# Patient Record
Sex: FEMALE | Race: BLACK OR AFRICAN AMERICAN | NOT HISPANIC OR LATINO | Employment: FULL TIME | URBAN - METROPOLITAN AREA
[De-identification: names, ages, dates, MRNs, and addresses within clinical notes are randomized per-mention and may not be internally consistent; named-entity substitution may affect disease eponyms.]

---

## 2017-06-21 ENCOUNTER — HOSPITAL ENCOUNTER (OUTPATIENT)
Dept: RADIOLOGY | Facility: HOSPITAL | Age: 52
Discharge: HOME | End: 2017-06-21
Attending: ORTHOPAEDIC SURGERY
Payer: OTHER GOVERNMENT

## 2017-06-21 ENCOUNTER — OFFICE VISIT (OUTPATIENT)
Dept: ORTHOPEDICS | Facility: CLINIC | Age: 52
End: 2017-06-21
Payer: OTHER GOVERNMENT

## 2017-06-21 VITALS — BODY MASS INDEX: 31.65 KG/M2 | WEIGHT: 190 LBS | HEIGHT: 65 IN

## 2017-06-21 DIAGNOSIS — M25.512 ACUTE PAIN OF LEFT SHOULDER: Primary | ICD-10-CM

## 2017-06-21 DIAGNOSIS — M75.102 TEAR OF LEFT ROTATOR CUFF, UNSPECIFIED TEAR EXTENT: ICD-10-CM

## 2017-06-21 DIAGNOSIS — M25.512 ACUTE PAIN OF LEFT SHOULDER: ICD-10-CM

## 2017-06-21 PROCEDURE — 73030 X-RAY EXAM OF SHOULDER: CPT | Mod: 26,LT,, | Performed by: RADIOLOGY

## 2017-06-21 PROCEDURE — 73030 X-RAY EXAM OF SHOULDER: CPT | Mod: TC,PN,LT

## 2017-06-21 PROCEDURE — 99999 PR PBB SHADOW E&M-EST. PATIENT-LVL II: CPT | Mod: PBBFAC,,, | Performed by: ORTHOPAEDIC SURGERY

## 2017-06-21 PROCEDURE — 99213 OFFICE O/P EST LOW 20 MIN: CPT | Mod: 25,S$GLB,, | Performed by: ORTHOPAEDIC SURGERY

## 2017-06-21 PROCEDURE — 20610 DRAIN/INJ JOINT/BURSA W/O US: CPT | Mod: LT,S$GLB,, | Performed by: ORTHOPAEDIC SURGERY

## 2017-06-21 RX ORDER — TRIAMCINOLONE ACETONIDE 40 MG/ML
40 INJECTION, SUSPENSION INTRA-ARTICULAR; INTRAMUSCULAR
Status: DISCONTINUED | OUTPATIENT
Start: 2017-06-21 | End: 2017-06-21 | Stop reason: HOSPADM

## 2017-06-21 RX ADMIN — TRIAMCINOLONE ACETONIDE 40 MG: 40 INJECTION, SUSPENSION INTRA-ARTICULAR; INTRAMUSCULAR at 02:06

## 2017-06-21 NOTE — PROGRESS NOTES
"DATE: 6/21/2017  PATIENT: Antonina Roman    Attending Physician: Glynn Fitch M.D.    HISTORY:  Antonina Roman is a 51 y.o. female who returns for follow up evaluation of  her left shoulder.  She is seen in December 2016 diagnosed with a high-grade partial-thickness rotator cuff tear versus small full-thickness rotator cuff tear.  States she still has a moderate amount of pain.  Pain is reported at 6/10 today.  She does have some improvement from cortisone injection however she still notes moderate discomfort    PMH/PSH/FamHx/SocHx:  Reviewed and unchanged from prior visit    ROS:  Constitution: Negative for chills, fever, and sweats. Negative for unexplained weight loss.  HENT: Negative for headaches and blurry vision.   Cardiovascular: Negative for chest pain, irregular heartbeat, leg swelling and palpitations.   Respiratory: Negative for cough and shortness of breath.   Gastrointestinal: Negative for abdominal pain, heartburn, nausea and vomiting.   Genitourinary: Negative for bladder incontinence and dysuria.   Musculoskeletal: Negative for systemic arthritis, joint swelling, muscle weakness and myalgias.   Neurological: Negative for numbness.   Psychiatric/Behavioral: Negative for depression.   Endocrine: Negative for polyuria.   Hematologic/Lymphatic: Negative for bleeding disorders.  Skin: Negative for poor wound healing.       EXAM:  Ht 5' 5" (1.651 m)   Wt 86.2 kg (190 lb)   BMI 31.62 kg/m²   Antonina Roman is a well developed, well nourished female in no acute distress. Physical examination of the left shoulder evaluated the following:    Inspection, palpation and ROM of the cervical spine  Disc compression testing bilaterally  Inspection for swelling, ecchymosis, erythema, deformity and atrophy  Tenderness to palpation of the soft tissue and bony structures  Active and passive range of motion  Sensation of the shoulder and upper extremity  Motor strength in the deltoid, supraspinatus, internal rotators " and external rotators  Impingement, apprehension, relocation and Speed's tests  Upper extremity vascular exam (skin temp,color, capillary refill)  Inspection for pseudomotor signs    Remarkable findings included:  Full range of motion cervical spine.  Negative disc compression sign.  Range of motion is intact to the left shoulder.  Positive impingement sign on exam.        IMAGING:   No new x-rays are performed today.    ASSESSMENT:  High-grade partial-thickness versus small full-thickness rotator cuff tear left shoulder    PLAN:  The implications of the patient's evolution of symptoms and findings were explained to the patient in detail.Treatment option discussed included continued observation, repeat cortisone injection, and arthroscopic rotator cuff repair. All questions answered and the patient wishes to proceed with repeat injection as she states she is currently in school and cannot take time off the surgical procedure.  Injection performed today (see procedure note).  Follow-up if not improving or worse..          This note was dictated using voice recognition software and may contain grammatical errors

## 2017-06-21 NOTE — PROCEDURES
Large Joint Aspiration/Injection  Date/Time: 6/21/2017 2:30 PM  Performed by: TG OLGUIN  Authorized by: TG OLGUIN     Consent Done?:  Yes (Verbal)  Indications:  Pain  Timeout: Prior to procedure the correct patient, procedure, and site was verified      Location:  Shoulder  Site:  L subacromial bursa  Prep: Patient was prepped and draped in usual sterile fashion    Ultrasonic Guidance for needle placement: No  Needle size:  25 G  Approach:  Posterior  Medications:  40 mg triamcinolone acetonide 40 mg/mL  Patient tolerance:  Patient tolerated the procedure well with no immediate complications

## 2017-08-16 ENCOUNTER — OFFICE VISIT (OUTPATIENT)
Dept: ORTHOPEDICS | Facility: CLINIC | Age: 52
End: 2017-08-16
Payer: OTHER GOVERNMENT

## 2017-08-16 VITALS — WEIGHT: 190 LBS | HEIGHT: 65 IN | BODY MASS INDEX: 31.65 KG/M2

## 2017-08-16 DIAGNOSIS — M75.102 TEAR OF LEFT ROTATOR CUFF, UNSPECIFIED TEAR EXTENT: Primary | ICD-10-CM

## 2017-08-16 PROCEDURE — 99999 PR PBB SHADOW E&M-EST. PATIENT-LVL II: CPT | Mod: PBBFAC,,, | Performed by: ORTHOPAEDIC SURGERY

## 2017-08-16 PROCEDURE — 3008F BODY MASS INDEX DOCD: CPT | Mod: S$GLB,,, | Performed by: ORTHOPAEDIC SURGERY

## 2017-08-16 PROCEDURE — 99213 OFFICE O/P EST LOW 20 MIN: CPT | Mod: S$GLB,,, | Performed by: ORTHOPAEDIC SURGERY

## 2017-08-17 NOTE — PROGRESS NOTES
"DATE: 8/16/2017  PATIENT: Antonina Roman    Attending Physician: Glynn Fitch M.D.    HISTORY:  Antonina Roman is a 51 y.o. female who returns for follow up evaluation of  her left shoulder.  She's been present diagnosed with a high-grade partial-thickness rotator cuff tear versus small full-thickness rotator cuff tear.  She underwent a cortisone injection on 6/21/17 which she states helped for approximately 10 days.  She has been working modified duty and appears tolerating it.  Pain is still reported at 7/10 today    PMH/PSH/FamHx/SocHx:  Reviewed and unchanged from prior visit    ROS:  Constitution: Negative for chills, fever, and sweats. Negative for unexplained weight loss.  HENT: Negative for headaches and blurry vision.   Cardiovascular: Negative for chest pain, irregular heartbeat, leg swelling and palpitations.   Respiratory: Negative for cough and shortness of breath.   Gastrointestinal: Negative for abdominal pain, heartburn, nausea and vomiting.   Genitourinary: Negative for bladder incontinence and dysuria.   Musculoskeletal: Negative for systemic arthritis, joint swelling, muscle weakness and myalgias.   Neurological: Negative for numbness.   Psychiatric/Behavioral: Negative for depression.   Endocrine: Negative for polyuria.   Hematologic/Lymphatic: Negative for bleeding disorders.  Skin: Negative for poor wound healing.       EXAM:  Ht 5' 5" (1.651 m)   Wt 86.2 kg (190 lb)   BMI 31.62 kg/m²   Antonina Roman is a well developed, well nourished female in no acute distress. Physical examination of the left shoulder evaluated the following:     Inspection, palpation and ROM of the cervical spine  Disc compression testing bilaterally  Inspection for swelling, ecchymosis, erythema, deformity and atrophy  Tenderness to palpation of the soft tissue and bony structures  Active and passive range of motion  Sensation of the shoulder and upper extremity  Motor strength in the deltoid, supraspinatus, internal " rotators and external rotators  Impingement, apprehension, relocation and Speed's tests  Upper extremity vascular exam (skin temp,color, capillary refill)  Inspection for pseudomotor signs     Remarkable findings included:  Full range of motion cervical spine.  Negative disc compression sign.  Range of motion is intact to the left shoulder.  Positive impingement sign on exam.    IMAGING:   No new x-rays are performed today.    ASSESSMENT:  High-grade partial-thickness versus small full-thickness rotator cuff tear left shoulder    PLAN:  The implications of the patient's evolution of symptoms and findings were explained to the patient in detail.  Again we discussed conservative treatment versus arthroscopic rotator cuff repair.  I've also explained that injections every 3-6 months to temporize her symptoms.  She reports she is tolerating light duty.  She would like to try to finish schooling before considering any surgical intervention.  She'll remain on light duty status.  Should she wish to pursue arthroscopic treatment she'll return for follow-up exam.  Follow-up if not improving or worse.          This note was dictated using voice recognition software and may contain grammatical errors

## 2017-08-29 ENCOUNTER — TELEPHONE (OUTPATIENT)
Dept: ORTHOPEDICS | Facility: CLINIC | Age: 52
End: 2017-08-29

## 2017-08-29 NOTE — TELEPHONE ENCOUNTER
Contacted pt. Pt states that was an earlier message and pt has already spoken to our office since this message.

## 2017-08-29 NOTE — TELEPHONE ENCOUNTER
----- Message from Antonina Castro sent at 8/29/2017  2:25 PM CDT -----  Call pt [phone]  Returning call

## 2017-09-27 ENCOUNTER — OFFICE VISIT (OUTPATIENT)
Dept: ORTHOPEDICS | Facility: CLINIC | Age: 52
End: 2017-09-27
Payer: OTHER GOVERNMENT

## 2017-09-27 VITALS — BODY MASS INDEX: 31.65 KG/M2 | WEIGHT: 190 LBS | HEIGHT: 65 IN

## 2017-09-27 DIAGNOSIS — M75.82 ROTATOR CUFF TENDINITIS, LEFT: Primary | ICD-10-CM

## 2017-09-27 PROCEDURE — 99999 PR PBB SHADOW E&M-EST. PATIENT-LVL II: CPT | Mod: PBBFAC,,, | Performed by: ORTHOPAEDIC SURGERY

## 2017-09-27 PROCEDURE — 20610 DRAIN/INJ JOINT/BURSA W/O US: CPT | Mod: LT,S$GLB,, | Performed by: ORTHOPAEDIC SURGERY

## 2017-09-27 PROCEDURE — 99213 OFFICE O/P EST LOW 20 MIN: CPT | Mod: 25,S$GLB,, | Performed by: ORTHOPAEDIC SURGERY

## 2017-09-27 PROCEDURE — 3008F BODY MASS INDEX DOCD: CPT | Mod: S$GLB,,, | Performed by: ORTHOPAEDIC SURGERY

## 2017-09-27 RX ORDER — TRIAMCINOLONE ACETONIDE 40 MG/ML
40 INJECTION, SUSPENSION INTRA-ARTICULAR; INTRAMUSCULAR
Status: DISCONTINUED | OUTPATIENT
Start: 2017-09-27 | End: 2017-09-27 | Stop reason: HOSPADM

## 2017-09-27 RX ADMIN — TRIAMCINOLONE ACETONIDE 40 MG: 40 INJECTION, SUSPENSION INTRA-ARTICULAR; INTRAMUSCULAR at 03:09

## 2017-09-27 NOTE — PROGRESS NOTES
"DATE: 9/27/2017  PATIENT: Antonina Roman    Attending Physician: Glynn Fitch M.D.    HISTORY:  Antonina Roman is a 51 y.o. female who returns for follow up evaluation of  her left shoulder.  She's been diagnosed with a high-grade partial-thickness rotator cuff tear versus small full-thickness rotator cuff tear.  She last had an injection 6/21/17.  She's been working modified duty as tolerated.  She states that her symptoms are the same and still rated at 7/10.  She is requesting repeat cortisone injection today.    PMH/PSH/FamHx/SocHx:  Reviewed and unchanged from prior visit    ROS:  Constitution: Negative for chills, fever, and sweats. Negative for unexplained weight loss.  HENT: Negative for headaches and blurry vision.   Cardiovascular: Negative for chest pain, irregular heartbeat, leg swelling and palpitations.   Respiratory: Negative for cough and shortness of breath.   Gastrointestinal: Negative for abdominal pain, heartburn, nausea and vomiting.   Genitourinary: Negative for bladder incontinence and dysuria.   Musculoskeletal: Negative for systemic arthritis, joint swelling, muscle weakness and myalgias.   Neurological: Negative for numbness.   Psychiatric/Behavioral: Negative for depression.   Endocrine: Negative for polyuria.   Hematologic/Lymphatic: Negative for bleeding disorders.  Skin: Negative for poor wound healing.       EXAM:  Ht 5' 5" (1.651 m)   Wt 86.2 kg (190 lb)   BMI 31.62 kg/m²   Antonina Roman is a well developed, well nourished female in no acute distress. Physical examination of the left shoulder evaluated the following:     Inspection, palpation and ROM of the cervical spine  Disc compression testing bilaterally  Inspection for swelling, ecchymosis, erythema, deformity and atrophy  Tenderness to palpation of the soft tissue and bony structures  Active and passive range of motion  Sensation of the shoulder and upper extremity  Motor strength in the deltoid, supraspinatus, internal " rotators and external rotators  Impingement, apprehension, relocation and Speed's tests  Upper extremity vascular exam (skin temp,color, capillary refill)  Inspection for pseudomotor signs     Remarkable findings included:  Full range of motion cervical spine.  Negative disc compression sign.  Range of motion is intact to the left shoulder.  Positive impingement sign on exam.       IMAGING:   No new x-rays are performed.    ASSESSMENT:  High-grade partial-thickness versus small full-thickness rotator cuff tear left shoulder    PLAN:  The implications of the patient's evolution of symptoms and findings were explained to the patient in detail.. repeat injection performed today (see procedure note).  She'll remain on modified duty work.  I've again recommended arthroscopy with rotator cuff repair.  She will think about her options.  Should she have further problems I will see her back.  Follow-up as needed.          This note was dictated using voice recognition software. Please excuse any grammatical or typographical errors.

## 2017-10-18 ENCOUNTER — OFFICE VISIT (OUTPATIENT)
Dept: ORTHOPEDICS | Facility: CLINIC | Age: 52
End: 2017-10-18
Payer: OTHER GOVERNMENT

## 2017-10-18 ENCOUNTER — TELEPHONE (OUTPATIENT)
Dept: ORTHOPEDICS | Facility: CLINIC | Age: 52
End: 2017-10-18

## 2017-10-18 VITALS — WEIGHT: 190 LBS | HEIGHT: 65 IN | BODY MASS INDEX: 31.65 KG/M2

## 2017-10-18 DIAGNOSIS — M75.82 ROTATOR CUFF TENDINITIS, LEFT: Primary | ICD-10-CM

## 2017-10-18 PROCEDURE — 99999 PR PBB SHADOW E&M-EST. PATIENT-LVL II: CPT | Mod: PBBFAC,,, | Performed by: ORTHOPAEDIC SURGERY

## 2017-10-18 PROCEDURE — 99213 OFFICE O/P EST LOW 20 MIN: CPT | Mod: S$GLB,,, | Performed by: ORTHOPAEDIC SURGERY

## 2017-10-18 NOTE — PROGRESS NOTES
"DATE: 10/18/2017  PATIENT: Antonina Roman    Attending Physician: Glynn Fitch M.D.    HISTORY:  Antonina Roman is a 51 y.o. female who returns for follow up evaluation of  her left shoulder.  She's been present diagnosed with a high-grade partial-thickness rotator cuff tear versus small full-thickness rotator cuff tear.  She's undergone previous injections which only helped minimally.  He had discussed surgical treatment.  However she is deferred.  She's been working modified light duty position.  States she is able perform that job like to try to decrease her restrictions.  Pain is reported at 5/10 today.    PMH/PSH/FamHx/SocHx:  Reviewed and unchanged from prior visit    ROS:  Constitution: Negative for chills, fever, and sweats. Negative for unexplained weight loss.  HENT: Negative for headaches and blurry vision.   Cardiovascular: Negative for chest pain, irregular heartbeat, leg swelling and palpitations.   Respiratory: Negative for cough and shortness of breath.   Gastrointestinal: Negative for abdominal pain, heartburn, nausea and vomiting.   Genitourinary: Negative for bladder incontinence and dysuria.   Musculoskeletal: Negative for systemic arthritis, joint swelling, muscle weakness and myalgias.   Neurological: Negative for numbness.   Psychiatric/Behavioral: Negative for depression.   Endocrine: Negative for polyuria.   Hematologic/Lymphatic: Negative for bleeding disorders.  Skin: Negative for poor wound healing.       EXAM:  Ht 5' 5" (1.651 m)   Wt 86.2 kg (190 lb)   BMI 31.62 kg/m²   Antonina Roman is a well developed, well nourished female in no acute distress. Physical examination of the left shoulder evaluated the following:     Inspection, palpation and ROM of the cervical spine  Disc compression testing bilaterally  Inspection for swelling, ecchymosis, erythema, deformity and atrophy  Tenderness to palpation of the soft tissue and bony structures  Active and passive range of motion  Sensation of " the shoulder and upper extremity  Motor strength in the deltoid, supraspinatus, internal rotators and external rotators  Impingement, apprehension, relocation and Speed's tests  Upper extremity vascular exam (skin temp,color, capillary refill)  Inspection for pseudomotor signs     Remarkable findings included:  Full range of motion cervical spine.  Negative disc compression sign.  Range of motion is intact to the left shoulder.  Positive impingement sign on exam.       IMAGING:   No new x-rays are performed today.    ASSESSMENT:  High-grade partial-thickness versus small full-thickness rotator cuff tear left shoulder    PLAN:  The implications of the patient's evolution of symptoms and findings were explained to the patient in detail.  I've explained the Antonina that treatment is either tolerating the symptoms versus arthroscopy and probable rotator cuff repair.  Again she would like to avoid surgery if possible at this time.  However she thinks she can perform more of her duties then previously.  We have changed her work restrictions to no driving more than 30 minutes to times daily and no lifting greater than 20 pounds.  Should she have further problems, I will see her back.  Otherwise, follow-up as needed.          This note was dictated using voice recognition software. Please excuse any grammatical or typographical errors.

## 2017-10-18 NOTE — TELEPHONE ENCOUNTER
----- Message from Sathish Pascual sent at 10/17/2017 11:29 AM CDT -----  Contact: Whitney with [employer]   Whitney with [employer] want to speak with a nurse regarding patient job offer with workers comp, please fax to [phone] or please call [phone]

## 2018-01-04 RX ORDER — METHYLPREDNISOLONE 4 MG/1
TABLET ORAL
Qty: 21 TABLET | Refills: 1 | OUTPATIENT
Start: 2018-01-04

## 2018-01-08 RX ORDER — METHYLPREDNISOLONE 4 MG/1
TABLET ORAL
Qty: 21 TABLET | Refills: 1 | OUTPATIENT
Start: 2018-01-08

## 2018-01-17 ENCOUNTER — OFFICE VISIT (OUTPATIENT)
Dept: ORTHOPEDICS | Facility: CLINIC | Age: 53
End: 2018-01-17
Payer: OTHER GOVERNMENT

## 2018-01-17 VITALS — WEIGHT: 190.06 LBS | BODY MASS INDEX: 31.67 KG/M2 | HEIGHT: 65 IN

## 2018-01-17 DIAGNOSIS — M75.102 TEAR OF LEFT ROTATOR CUFF, UNSPECIFIED TEAR EXTENT: Primary | ICD-10-CM

## 2018-01-17 PROCEDURE — 99213 OFFICE O/P EST LOW 20 MIN: CPT | Mod: S$GLB,,, | Performed by: ORTHOPAEDIC SURGERY

## 2018-01-17 PROCEDURE — 99999 PR PBB SHADOW E&M-EST. PATIENT-LVL II: CPT | Mod: PBBFAC,,, | Performed by: ORTHOPAEDIC SURGERY

## 2018-01-17 RX ORDER — DICLOFENAC SODIUM 10 MG/G
GEL TOPICAL
Refills: 0 | COMMUNITY
Start: 2017-10-11

## 2018-01-17 RX ORDER — HYOSCYAMINE SULFATE 0.12 MG/1
TABLET SUBLINGUAL
Refills: 0 | COMMUNITY
Start: 2017-10-24

## 2018-01-17 RX ORDER — CYANOCOBALAMIN 1000 UG/ML
INJECTION, SOLUTION INTRAMUSCULAR; SUBCUTANEOUS
Refills: 0 | COMMUNITY
Start: 2017-11-20

## 2018-01-18 NOTE — PROGRESS NOTES
"DATE: 1/17/2018  PATIENT: Antonina Roman    Attending Physician: Glynn Fitch M.D.    HISTORY:  Antonina Roman is a 52 y.o. female who returns for follow up evaluation of  her left shoulder.  She's been diagnosed with a high-grade partial-thickness rotator cuff tear.  Surgery was recommended however, he states that she is trying to hold off at this time.  She is been performing light duty work.  However she notes that they've been increasing her job duties seeding the work limitations.  This is aggravated her pain.  Pain is reported at 8/10 today.  She is requesting a refill of Flexeril, meloxicam and a Medrol    PMH/PSH/FamHx/SocHx:  Reviewed and unchanged from prior visit    ROS:  Constitution: Negative for chills, fever, and sweats. Negative for unexplained weight loss.  HENT: Negative for headaches and blurry vision.   Cardiovascular: Negative for chest pain, irregular heartbeat, leg swelling and palpitations.   Respiratory: Negative for cough and shortness of breath.   Gastrointestinal: Negative for abdominal pain, heartburn, nausea and vomiting.   Genitourinary: Negative for bladder incontinence and dysuria.   Musculoskeletal: Negative for systemic arthritis, joint swelling, muscle weakness and myalgias.   Neurological: Negative for numbness.   Psychiatric/Behavioral: Negative for depression.   Endocrine: Negative for polyuria.   Hematologic/Lymphatic: Negative for bleeding disorders.  Skin: Negative for poor wound healing.       EXAM:  Ht 5' 5" (1.651 m)   Wt 86.2 kg (190 lb 0.6 oz)   BMI 31.62 kg/m²   Antonina Roman is a well developed, well nourished female in no acute distress. Physical examination of the left shoulder evaluated the following:     Inspection, palpation and ROM of the cervical spine  Disc compression testing bilaterally  Inspection for swelling, ecchymosis, erythema, deformity and atrophy  Tenderness to palpation of the soft tissue and bony structures  Active and passive range of " motion  Sensation of the shoulder and upper extremity  Motor strength in the deltoid, supraspinatus, internal rotators and external rotators  Impingement, apprehension, relocation and Speed's tests  Upper extremity vascular exam (skin temp,color, capillary refill)  Inspection for pseudomotor signs     Remarkable findings included:  Full range of motion cervical spine.  Negative disc compression sign.  Range of motion is intact to the left shoulder.  Positive impingement sign on exam.    IMAGING:   No x-rays are performed today.    ASSESSMENT:  High-grade partial-thickness versus small full-thickness rotator cuff tear left shoulder    PLAN:  The implications of the patient's evolution of symptoms and findings were explained to the patient in detail.  Again explained to Oksana that at this point treatment options include tolerating the symptoms versus arthroscopy.  Again she wishes to remain on modified duty work and noted reviewed her restrictions.  I stated that the less she we'll proceed with arthroscopic rotator cuff repair, that he is at maximum medical improvement and those restrictions will not change.  Work restrictions are no driving more than 30 minutes twice daily and no lifting greater than 20 pounds.  I've also explained that medication refills should come through her primary care physician.  Should she wish to pursue arthroscopic rotator cuff repair I will see her back.  Otherwise, follow-up as needed.         This note was dictated using voice recognition software. Please excuse any grammatical or typographical errors.

## 2019-08-13 ENCOUNTER — OFFICE VISIT (OUTPATIENT)
Dept: ORTHOPEDICS | Facility: CLINIC | Age: 54
End: 2019-08-13
Payer: OTHER GOVERNMENT

## 2019-08-13 VITALS
BODY MASS INDEX: 30.82 KG/M2 | SYSTOLIC BLOOD PRESSURE: 138 MMHG | DIASTOLIC BLOOD PRESSURE: 88 MMHG | HEIGHT: 65 IN | WEIGHT: 185 LBS | HEART RATE: 63 BPM

## 2019-08-13 DIAGNOSIS — M75.42 IMPINGEMENT SYNDROME OF LEFT SHOULDER: ICD-10-CM

## 2019-08-13 DIAGNOSIS — M54.12 CERVICAL RADICULITIS: Primary | ICD-10-CM

## 2019-08-13 DIAGNOSIS — M75.102 TEAR OF LEFT ROTATOR CUFF, UNSPECIFIED TEAR EXTENT: ICD-10-CM

## 2019-08-13 PROCEDURE — 99204 PR OFFICE/OUTPT VISIT, NEW, LEVL IV, 45-59 MIN: ICD-10-PCS | Mod: S$GLB,,, | Performed by: ORTHOPAEDIC SURGERY

## 2019-08-13 PROCEDURE — 99204 OFFICE O/P NEW MOD 45 MIN: CPT | Mod: S$GLB,,, | Performed by: ORTHOPAEDIC SURGERY

## 2019-08-13 RX ORDER — PANTOPRAZOLE SODIUM 40 MG/1
TABLET, DELAYED RELEASE ORAL
Refills: 0 | COMMUNITY
Start: 2019-07-16 | End: 2019-08-13

## 2019-08-13 RX ORDER — ACYCLOVIR 400 MG/1
TABLET ORAL
Refills: 11 | COMMUNITY
Start: 2019-07-20

## 2019-08-13 RX ORDER — SUCRALFATE 1 G/1
TABLET ORAL
Refills: 0 | COMMUNITY
Start: 2019-07-16

## 2019-08-13 RX ORDER — LANSOPRAZOLE 30 MG/1
30 CAPSULE, DELAYED RELEASE ORAL DAILY
COMMUNITY

## 2019-08-13 NOTE — PROCEDURES
Large Joint Aspiration/Injection: L subacromial bursa  Date/Time: 8/13/2019 8:00 AM  Performed by: Sami Bunch MD  Authorized by: Sami Bunch MD     Consent Done?:  Yes (Verbal)  Indications:  Pain  Procedure site marked: Yes    Timeout: Prior to procedure the correct patient, procedure, and site was verified    Anesthesia  Local anesthesia used  Anesthesia: local infiltration  Anesthetic: lidocaine 1% without epinephrine    Location:  Shoulder  Site:  L subacromial bursa  Prep: Patient was prepped and draped in usual sterile fashion    Needle size:  25 G  Medications:  40 mg methylPREDNISolone acetate 40 mg/mL  Patient tolerance:  Patient tolerated the procedure well with no immediate complications
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
## 2019-09-09 ENCOUNTER — TELEPHONE (OUTPATIENT)
Dept: ORTHOPEDICS | Facility: CLINIC | Age: 54
End: 2019-09-09

## 2019-09-09 NOTE — TELEPHONE ENCOUNTER
----- Message from Mattie Padilla sent at 9/9/2019  8:29 AM CDT -----  Contact: Patient  Patient need a letter from Dr. Bunch stating she can not do compressions. Please call [phone]

## 2019-09-09 NOTE — LETTER
September 9, 2019    Antonina Roman  [address]  [place] [address]             [facility]  [address]  [place] [address]  Phone: [phone]  Fax: [phone] To Whom It May Concern:     Ms. Roman has a left shoulder rotator cuff tear along with subacromial impingement. Could you please excuse Ms. Roman from doing compressions as this can be painful for her at this time.    If you have any questions, please call my office.      Sincerely,      Sami Bunch MD

## 2019-09-11 ENCOUNTER — TELEPHONE (OUTPATIENT)
Dept: ORTHOPEDICS | Facility: CLINIC | Age: 54
End: 2019-09-11

## 2019-09-27 ENCOUNTER — TELEPHONE (OUTPATIENT)
Dept: ORTHOPEDICS | Facility: CLINIC | Age: 54
End: 2019-09-27

## 2019-09-27 NOTE — TELEPHONE ENCOUNTER
----- Message from Kristin Chang sent at 9/26/2019  8:56 AM CDT -----  Contact: Patient [phone]  Please call patient about her surgery date. Dr Bunch

## 2019-10-10 ENCOUNTER — OFFICE VISIT (OUTPATIENT)
Dept: ORTHOPEDICS | Facility: CLINIC | Age: 54
End: 2019-10-10
Payer: OTHER GOVERNMENT

## 2019-10-10 VITALS
WEIGHT: 188 LBS | SYSTOLIC BLOOD PRESSURE: 125 MMHG | HEIGHT: 65 IN | HEART RATE: 106 BPM | BODY MASS INDEX: 31.32 KG/M2 | DIASTOLIC BLOOD PRESSURE: 78 MMHG

## 2019-10-10 DIAGNOSIS — M75.42 IMPINGEMENT SYNDROME OF LEFT SHOULDER: Primary | ICD-10-CM

## 2019-10-10 DIAGNOSIS — M75.82 ROTATOR CUFF TENDINITIS, LEFT: ICD-10-CM

## 2019-10-10 PROCEDURE — 99213 PR OFFICE/OUTPT VISIT, EST, LEVL III, 20-29 MIN: ICD-10-PCS | Mod: 25,S$GLB,, | Performed by: ORTHOPAEDIC SURGERY

## 2019-10-10 PROCEDURE — 20610 DRAIN/INJ JOINT/BURSA W/O US: CPT | Mod: LT,S$GLB,, | Performed by: ORTHOPAEDIC SURGERY

## 2019-10-10 PROCEDURE — 99213 OFFICE O/P EST LOW 20 MIN: CPT | Mod: 25,S$GLB,, | Performed by: ORTHOPAEDIC SURGERY

## 2019-10-10 PROCEDURE — 20610 LARGE JOINT ASPIRATION/INJECTION: L SUBACROMIAL BURSA: ICD-10-PCS | Mod: LT,S$GLB,, | Performed by: ORTHOPAEDIC SURGERY

## 2019-10-10 RX ORDER — METHYLPREDNISOLONE ACETATE 40 MG/ML
40 INJECTION, SUSPENSION INTRA-ARTICULAR; INTRALESIONAL; INTRAMUSCULAR; SOFT TISSUE
Status: DISCONTINUED | OUTPATIENT
Start: 2019-10-10 | End: 2019-10-10 | Stop reason: HOSPADM

## 2019-10-10 RX ADMIN — METHYLPREDNISOLONE ACETATE 40 MG: 40 INJECTION, SUSPENSION INTRA-ARTICULAR; INTRALESIONAL; INTRAMUSCULAR; SOFT TISSUE at 03:10

## 2019-10-10 NOTE — PROCEDURES
Large Joint Aspiration/Injection: L subacromial bursa  Date/Time: 10/10/2019 3:45 PM  Performed by: Sami Bunch MD  Authorized by: Sami Bunch MD     Consent Done?:  Yes (Verbal)  Indications:  Pain  Procedure site marked: Yes    Timeout: Prior to procedure the correct patient, procedure, and site was verified    Anesthesia  Local anesthesia used  Anesthesia: local infiltration  Anesthetic: lidocaine 1% without epinephrine    Location:  Shoulder  Site:  L subacromial bursa  Prep: Patient was prepped and draped in usual sterile fashion    Needle size:  25 G  Medications:  40 mg methylPREDNISolone acetate 40 mg/mL; 40 mg methylPREDNISolone acetate 40 mg/mL  Patient tolerance:  Patient tolerated the procedure well with no immediate complications

## 2019-10-10 NOTE — PROGRESS NOTES
[facility]    Subjective:     Chief Complaint:   Chief Complaint   Patient presents with    Left Shoulder - Pain     Here for left shoulder injection today.  Would like to start out patient therapy       Past Medical History:   Diagnosis Date    Arthritis        Past Surgical History:   Procedure Laterality Date    gastric sleeve         Current Outpatient Medications   Medication Sig    acyclovir (ZOVIRAX) 400 MG tablet     biotin 300 mcg Tab Take 1 tablet by mouth once daily.    calcium carbonate (OS-DAMI) 500 mg calcium (1,250 mg) tablet Take 1 tablet by mouth once daily.    cyanocobalamin 1,000 mcg/mL injection     cyanocobalamin, vitamin B-12, 1,000 mcg/mL Kit Inject 1,000 mcg into the muscle every 30 days.    cyclobenzaprine (FLEXERIL) 10 MG tablet take 1 tablet by mouth NIGHTLY if needed for muscle spasm (WILL CAUSE DROWSINESS)    diclofenac sodium 1 % Gel     hyoscyamine (LEVSIN/SL) 0.125 mg Subl dissolve 1 tablet under the tongue four times a day if needed    lansoprazole (PREVACID) 30 MG capsule Take 30 mg by mouth once daily.    multivitamin capsule Take 1 capsule by mouth once daily.    naproxen (NAPROSYN) 500 MG tablet Take 500 mg by mouth 2 (two) times daily with meals.    sucralfate (CARAFATE) 1 gram tablet MIX 1 T WITH 10 ML OF WARM WATER TO MAKE A SLURRY AND THEN TK PO QID    tramadol (ULTRAM) 50 mg tablet Take 50 mg by mouth every 6 (six) hours as needed.     No current facility-administered medications for this visit.        Review of patient's allergies indicates:  No Known Allergies    Family History   Problem Relation Age of Onset    Hypertension Mother     Diabetes Mother     Cancer Father        Social History     Socioeconomic History    Marital status:      Spouse name: Not on file    Number of children: Not on file    Years of education: Not on file    Highest education level: Not on file   Occupational History    Not on file   Social Needs     Financial resource strain: Not on file    Food insecurity:     Worry: Not on file     Inability: Not on file    Transportation needs:     Medical: Not on file     Non-medical: Not on file   Tobacco Use    Smoking status: Never Smoker    Smokeless tobacco: Never Used   Substance and Sexual Activity    Alcohol use: Yes     Comment: socially    Drug use: No    Sexual activity: Not on file   Lifestyle    Physical activity:     Days per week: Not on file     Minutes per session: Not on file    Stress: Not on file   Relationships    Social connections:     Talks on phone: Not on file     Gets together: Not on file     Attends [employer] service: Not on file     Active member of club or organization: Not on file     Attends meetings of clubs or organizations: Not on file     Relationship status: Not on file   Other Topics Concern    Not on file   Social History Narrative    Not on file       History of present illness: Patient comes in today for left shoulder. She continues to have severe shoulder pain. Her surgery has been approved. It's has been scheduled for November. This is at the patient's request.      Review of Systems:    Constitution: Negative for chills, fever, and sweats.  Negative for unexplained weight loss.    HENT:  Negative for headaches and blurry vision.    Cardiovascular:Negative for chest pain or irregular heart beat. Negative for hypertension.    Respiratory:  Negative for cough and shortness of breath.    Gastrointestinal: Negative for abdominal pain, heartburn, melena, nausea, and vomitting.    Genitourinary:  Negative bladder incontinence and dysuria.    Musculoskeletal:  See HPI for details.     Neurological: Negative for numbness.    Psychiatric/Behavioral: Negative for depression.  The patient is not nervous/anxious.      Endocrine: Negative for polyuria    Hematologic/Lymphatic: Negative for bleeding problem.  Does not bruise/bleed easily.    Skin: Negative for poor would healing and  rash    Objective:      Physical Examination:    Vital Signs:    Vitals:    10/10/19 1615   BP: 125/78   Pulse: 106       Body mass index is 31.28 kg/m².    This a well-developed, well nourished patient in no acute distress.  They are alert and oriented and cooperative to examination.        Patient has full range of motion the left shoulder. Positive impingement. Her rotator cuff is very tender and very weak  Pertinent New Results:    XRAY Report / Interpretation:       Assessment/Plan:      Impingement syndrome left shoulder. Surgery scheduled. She has failed conservative therapy. I did inject her today just to give her some relief until surgery. She continues to work her regular job without restrictions.      This note was created using Dragon voice recognition software that occasionally misinterpreted phrases or words.

## 2019-10-14 ENCOUNTER — TELEPHONE (OUTPATIENT)
Dept: ORTHOPEDICS | Facility: CLINIC | Age: 54
End: 2019-10-14

## 2019-10-14 NOTE — TELEPHONE ENCOUNTER
----- Message from Dolly Martínez sent at 10/11/2019 12:06 PM CDT -----  Contact: patient  Patient was calling because she needed a letter stating her driving restrictions, call her back at [phone].

## 2019-10-15 ENCOUNTER — TELEPHONE (OUTPATIENT)
Dept: ORTHOPEDICS | Facility: CLINIC | Age: 54
End: 2019-10-15

## 2019-10-15 DIAGNOSIS — M75.82 ROTATOR CUFF TENDINITIS, LEFT: ICD-10-CM

## 2019-10-15 DIAGNOSIS — M75.42 IMPINGEMENT SYNDROME OF LEFT SHOULDER: Primary | ICD-10-CM

## 2019-10-15 DIAGNOSIS — M19.012 ARTHRITIS OF LEFT SHOULDER REGION: ICD-10-CM

## 2019-10-31 ENCOUNTER — TELEPHONE (OUTPATIENT)
Dept: ORTHOPEDICS | Facility: CLINIC | Age: 54
End: 2019-10-31

## 2019-10-31 NOTE — TELEPHONE ENCOUNTER
----- Message from Hollie Cantu sent at 10/31/2019  9:09 AM CDT -----  Contact: patient  Dr sanz pt-called scheduled for surgery next week and she has a few questions pts# [phone]

## 2019-11-04 ENCOUNTER — TELEPHONE (OUTPATIENT)
Dept: ORTHOPEDICS | Facility: CLINIC | Age: 54
End: 2019-11-04

## 2019-11-04 NOTE — TELEPHONE ENCOUNTER
----- Message from Mattie Padilla sent at 11/4/2019 12:23 PM CST -----  Contact: Patient  Patient

## 2019-11-07 ENCOUNTER — TELEPHONE (OUTPATIENT)
Dept: ORTHOPEDICS | Facility: CLINIC | Age: 54
End: 2019-11-07

## 2019-11-07 DIAGNOSIS — M19.012 ARTHRITIS OF LEFT SHOULDER REGION: ICD-10-CM

## 2019-11-07 DIAGNOSIS — M75.82 ROTATOR CUFF TENDINITIS, LEFT: ICD-10-CM

## 2019-11-07 DIAGNOSIS — M75.42 IMPINGEMENT SYNDROME OF LEFT SHOULDER: Primary | ICD-10-CM

## 2020-03-21 ENCOUNTER — HOSPITAL ENCOUNTER (OUTPATIENT)
Dept: RADIOLOGY | Facility: HOSPITAL | Age: 55
Discharge: HOME | End: 2020-03-21
Attending: NURSE PRACTITIONER
Payer: COMMERCIAL

## 2020-03-21 DIAGNOSIS — S39.91XA BLUNT ABDOMINAL TRAUMA, INITIAL ENCOUNTER: Primary | ICD-10-CM

## 2020-03-21 DIAGNOSIS — S39.91XA BLUNT ABDOMINAL TRAUMA, INITIAL ENCOUNTER: ICD-10-CM

## 2020-03-21 DIAGNOSIS — R10.9 LEFT FLANK PAIN: ICD-10-CM

## 2020-03-21 DIAGNOSIS — R31.9 HEMATURIA, UNSPECIFIED TYPE: ICD-10-CM

## 2020-03-21 DIAGNOSIS — R31.9 HEMATURIA SYNDROME: ICD-10-CM

## 2020-03-21 PROCEDURE — 74176 CT ABD & PELVIS W/O CONTRAST: CPT | Mod: TC

## 2020-05-28 ENCOUNTER — LAB VISIT (OUTPATIENT)
Dept: PRIMARY CARE CLINIC | Facility: CLINIC | Age: 55
End: 2020-05-28

## 2020-05-28 DIAGNOSIS — R05.9 COUGH: Primary | ICD-10-CM

## 2020-05-28 PROCEDURE — U0003 INFECTIOUS AGENT DETECTION BY NUCLEIC ACID (DNA OR RNA); SEVERE ACUTE RESPIRATORY SYNDROME CORONAVIRUS 2 (SARS-COV-2) (CORONAVIRUS DISEASE [COVID-19]), AMPLIFIED PROBE TECHNIQUE, MAKING USE OF HIGH THROUGHPUT TECHNOLOGIES AS DESCRIBED BY CMS-2020-01-R: HCPCS

## 2020-05-29 LAB — SARS-COV-2 RNA RESP QL NAA+PROBE: NOT DETECTED

## 2021-04-30 ENCOUNTER — OCCUPATIONAL HEALTH (OUTPATIENT)
Dept: URGENT CARE | Facility: CLINIC | Age: 56
End: 2021-04-30

## 2021-04-30 PROCEDURE — 86580 TB INTRADERMAL TEST: CPT | Mod: S$GLB,,, | Performed by: EMERGENCY MEDICINE

## 2021-04-30 PROCEDURE — 86580 PR  TB INTRADERMAL TEST: ICD-10-PCS | Mod: S$GLB,,, | Performed by: EMERGENCY MEDICINE

## 2021-04-30 PROCEDURE — 80305 PR COLLECTION ONLY DRUG SCREEN: ICD-10-PCS | Mod: S$GLB,,, | Performed by: EMERGENCY MEDICINE

## 2021-04-30 PROCEDURE — 80305 DRUG TEST PRSMV DIR OPT OBS: CPT | Mod: S$GLB,,, | Performed by: EMERGENCY MEDICINE

## 2021-09-09 ENCOUNTER — OCCUPATIONAL HEALTH (OUTPATIENT)
Dept: URGENT CARE | Facility: CLINIC | Age: 56
End: 2021-09-09

## 2021-09-09 PROCEDURE — 80305 PR COLLECTION ONLY DRUG SCREEN: ICD-10-PCS | Mod: S$GLB,,, | Performed by: EMERGENCY MEDICINE

## 2021-09-09 PROCEDURE — 80305 DRUG TEST PRSMV DIR OPT OBS: CPT | Mod: S$GLB,,, | Performed by: EMERGENCY MEDICINE

## 2024-01-18 ENCOUNTER — TELEPHONE (OUTPATIENT)
Dept: PSYCHIATRY | Facility: CLINIC | Age: 59
End: 2024-01-18

## 2024-01-18 NOTE — TELEPHONE ENCOUNTER
Returned call to patient regarding a new patient appt with Dr. Byers. Advised her that Dr. Byers is not taking new patients but we do have other providers that are. Advised patient that our office requires a referral from an [facility] provider in order to be seen. Once the referral is received they will be placed on our wait list. Referrals are worked in order as they are received. Current wait is approximately 8-12 months. Patient stated that she does not have an [facility] provider and why does it need to come from one. I advised her that our wait list is quite extensive already and that is with just referrals from [facility] providers if we were taking any referrals the wait list would quadruple Patient stated that was just stupid and I can't wait that long and hung up.

## 2024-06-20 DIAGNOSIS — Z13.6 ENCOUNTER FOR SCREENING FOR CARDIOVASCULAR DISORDERS: Primary | ICD-10-CM

## 2024-06-20 DIAGNOSIS — Z82.49 FAMILY HISTORY OF EARLY CAD: ICD-10-CM

## 2024-07-05 ENCOUNTER — HOSPITAL ENCOUNTER (OUTPATIENT)
Dept: RADIOLOGY | Facility: HOSPITAL | Age: 59
Discharge: HOME | End: 2024-07-05
Attending: NURSE PRACTITIONER
Payer: COMMERCIAL

## 2024-07-05 ENCOUNTER — HOSPITAL ENCOUNTER (OUTPATIENT)
Dept: CARDIOLOGY | Facility: HOSPITAL | Age: 59
Discharge: HOME | End: 2024-07-05
Attending: NURSE PRACTITIONER
Payer: COMMERCIAL

## 2024-07-05 VITALS — BODY MASS INDEX: 31.33 KG/M2 | HEIGHT: 65 IN | WEIGHT: 188.06 LBS

## 2024-07-05 DIAGNOSIS — Z82.49 FAMILY HISTORY OF EARLY CAD: ICD-10-CM

## 2024-07-05 DIAGNOSIS — Z13.6 ENCOUNTER FOR SCREENING FOR CARDIOVASCULAR DISORDERS: ICD-10-CM

## 2024-07-05 LAB
AORTIC ROOT ANNULUS: 2.8 CM
AORTIC VALVE CUSP SEPERATION: 1.9 CM
AV INDEX (PROSTH): 1.01
AV MEAN GRADIENT: 3 MMHG
AV PEAK GRADIENT: 6 MMHG
AV VALVE AREA BY VELOCITY RATIO: 2.99 CM²
AV VALVE AREA: 2.85 CM²
AV VELOCITY RATIO: 1.06
BSA FOR ECHO PROCEDURE: 1.98 M2
CV ECHO LV RWT: 0.5 CM
DOP CALC AO PEAK VEL: 1.24 M/S
DOP CALC AO VTI: 28.5 CM
DOP CALC LVOT AREA: 2.8 CM2
DOP CALC LVOT DIAMETER: 1.9 CM
DOP CALC LVOT PEAK VEL: 1.31 M/S
DOP CALC LVOT STROKE VOLUME: 81.33 CM3
DOP CALC MV VTI: 31.3 CM
DOP CALCLVOT PEAK VEL VTI: 28.7 CM
E WAVE DECELERATION TIME: 189 MSEC
E/A RATIO: 0.89
E/E' RATIO: 9.76 M/S
ECHO LV POSTERIOR WALL: 1 CM (ref 0.6–1.1)
FRACTIONAL SHORTENING: 38 % (ref 28–44)
INTERVENTRICULAR SEPTUM: 1 CM (ref 0.6–1.1)
IVC DIAMETER: 1.6 CM
IVRT: 88 MSEC
LEFT ATRIUM AREA SYSTOLIC (APICAL 2 CHAMBER): 19.2 CM2
LEFT ATRIUM AREA SYSTOLIC (APICAL 4 CHAMBER): 19.2 CM2
LEFT ATRIUM SIZE: 3.5 CM
LEFT ATRIUM VOLUME INDEX MOD: 30.9 ML/M2
LEFT ATRIUM VOLUME MOD: 59.7 CM3
LEFT INTERNAL DIMENSION IN SYSTOLE: 2.5 CM (ref 2.1–4)
LEFT VENTRICLE DIASTOLIC VOLUME INDEX: 36.27 ML/M2
LEFT VENTRICLE DIASTOLIC VOLUME: 70 ML
LEFT VENTRICLE END SYSTOLIC VOLUME APICAL 2 CHAMBER: 60.6 ML
LEFT VENTRICLE END SYSTOLIC VOLUME APICAL 4 CHAMBER: 55.4 ML
LEFT VENTRICLE MASS INDEX: 66 G/M2
LEFT VENTRICLE SYSTOLIC VOLUME INDEX: 11.6 ML/M2
LEFT VENTRICLE SYSTOLIC VOLUME: 22.3 ML
LEFT VENTRICULAR INTERNAL DIMENSION IN DIASTOLE: 4 CM (ref 3.5–6)
LEFT VENTRICULAR MASS: 127.06 G
LV LATERAL E/E' RATIO: 9.22 M/S
LV SEPTAL E/E' RATIO: 10.38 M/S
LVED V (TEICH): 70 ML
LVES V (TEICH): 22.3 ML
LVOT MG: 3 MMHG
LVOT MV: 0.85 CM/S
MV MEAN GRADIENT: 2 MMHG
MV PEAK A VEL: 0.93 M/S
MV PEAK E VEL: 0.83 M/S
MV PEAK GRADIENT: 5 MMHG
MV VALVE AREA BY CONTINUITY EQUATION: 2.6 CM2
OHS CV RV/LV RATIO: 0.58 CM
PISA TR MAX VEL: 1.87 M/S
PV MV: 0.64 M/S
PV PEAK GRADIENT: 4 MMHG
PV PEAK VELOCITY: 0.94 M/S
RIGHT VENTRICULAR END-DIASTOLIC DIMENSION: 2.3 CM
RV TISSUE DOPPLER FREE WALL SYSTOLIC VELOCITY 1 (APICAL 4 CHAMBER VIEW): 16 CM/S
TDI LATERAL: 0.09 M/S
TDI SEPTAL: 0.08 M/S
TDI: 0.09 M/S
TR MAX PG: 14 MMHG
TRICUSPID ANNULAR PLANE SYSTOLIC EXCURSION: 1.79 CM
Z-SCORE OF LEFT VENTRICULAR DIMENSION IN END DIASTOLE: -3.12
Z-SCORE OF LEFT VENTRICULAR DIMENSION IN END SYSTOLE: -2.34

## 2024-07-05 PROCEDURE — 75571 CT HRT W/O DYE W/CA TEST: CPT | Mod: 26,,, | Performed by: RADIOLOGY

## 2024-07-05 PROCEDURE — 93306 TTE W/DOPPLER COMPLETE: CPT | Mod: 26,,, | Performed by: GENERAL PRACTICE

## 2024-07-05 PROCEDURE — 93306 TTE W/DOPPLER COMPLETE: CPT

## 2024-07-05 PROCEDURE — 75571 CT HRT W/O DYE W/CA TEST: CPT | Mod: TC

## 2024-11-15 DIAGNOSIS — M25.562 LEFT KNEE PAIN, UNSPECIFIED CHRONICITY: Primary | ICD-10-CM

## 2024-11-18 ENCOUNTER — HOSPITAL ENCOUNTER (OUTPATIENT)
Dept: RADIOLOGY | Facility: HOSPITAL | Age: 59
Discharge: HOME | End: 2024-11-18
Attending: ORTHOPAEDIC SURGERY
Payer: COMMERCIAL

## 2024-11-18 ENCOUNTER — OFFICE VISIT (OUTPATIENT)
Dept: ORTHOPEDICS | Facility: CLINIC | Age: 59
End: 2024-11-18
Payer: COMMERCIAL

## 2024-11-18 VITALS — BODY MASS INDEX: 31.33 KG/M2 | RESPIRATION RATE: 18 BRPM | WEIGHT: 188.06 LBS | HEIGHT: 65 IN

## 2024-11-18 DIAGNOSIS — M25.562 LEFT KNEE PAIN, UNSPECIFIED CHRONICITY: ICD-10-CM

## 2024-11-18 DIAGNOSIS — S83.282A TEAR OF LATERAL MENISCUS OF LEFT KNEE, UNSPECIFIED TEAR TYPE, UNSPECIFIED WHETHER OLD OR CURRENT TEAR, INITIAL ENCOUNTER: Primary | ICD-10-CM

## 2024-11-18 DIAGNOSIS — S83.242A OTHER TEAR OF MEDIAL MENISCUS OF LEFT KNEE AS CURRENT INJURY, INITIAL ENCOUNTER: Primary | ICD-10-CM

## 2024-11-18 PROCEDURE — 99204 OFFICE O/P NEW MOD 45 MIN: CPT | Mod: S$GLB,,, | Performed by: ORTHOPAEDIC SURGERY

## 2024-11-18 PROCEDURE — 1159F MED LIST DOCD IN RCRD: CPT | Mod: CPTII,S$GLB,, | Performed by: ORTHOPAEDIC SURGERY

## 2024-11-18 PROCEDURE — 73564 X-RAY EXAM KNEE 4 OR MORE: CPT | Mod: TC,PO,LT

## 2024-11-18 PROCEDURE — 3008F BODY MASS INDEX DOCD: CPT | Mod: CPTII,S$GLB,, | Performed by: ORTHOPAEDIC SURGERY

## 2024-11-18 PROCEDURE — 99999 PR PBB SHADOW E&M-EST. PATIENT-LVL III: CPT | Mod: PBBFAC,,, | Performed by: ORTHOPAEDIC SURGERY

## 2024-11-18 PROCEDURE — 73564 X-RAY EXAM KNEE 4 OR MORE: CPT | Mod: 26,LT,, | Performed by: RADIOLOGY

## 2024-11-18 PROCEDURE — 1160F RVW MEDS BY RX/DR IN RCRD: CPT | Mod: CPTII,S$GLB,, | Performed by: ORTHOPAEDIC SURGERY

## 2024-11-18 PROCEDURE — 73562 X-RAY EXAM OF KNEE 3: CPT | Mod: 26,59,RT, | Performed by: RADIOLOGY

## 2024-11-18 NOTE — PROGRESS NOTES
Past Medical History:   Diagnosis Date    Arthritis        Past Surgical History:   Procedure Laterality Date    gastric sleeve         Current Outpatient Medications   Medication Sig    calcium carbonate (OS-DAMI) 500 mg calcium (1,250 mg) tablet Take 1 tablet by mouth once daily.    cyanocobalamin, vitamin B-12, 1,000 mcg/mL Kit Inject 1,000 mcg into the muscle every 30 days.    cyclobenzaprine (FLEXERIL) 10 MG tablet take 1 tablet by mouth NIGHTLY if needed for muscle spasm (WILL CAUSE DROWSINESS)    diclofenac sodium 1 % Gel     hyoscyamine (LEVSIN/SL) 0.125 mg Subl dissolve 1 tablet under the tongue four times a day if needed    multivitamin capsule Take 1 capsule by mouth once daily.    naproxen (NAPROSYN) 500 MG tablet Take 500 mg by mouth 2 (two) times daily with meals.    tramadol (ULTRAM) 50 mg tablet Take 50 mg by mouth every 6 (six) hours as needed.     No current facility-administered medications for this visit.       Review of patient's allergies indicates:   Allergen Reactions    Adhesive tape-silicones Itching    Adhesive Itching    Chlorhexidin-isopropyl alcohol Edema, Hives and Itching       Family History   Problem Relation Name Age of Onset    Hypertension Mother      Diabetes Mother      Cancer Father         Social History     Socioeconomic History    Marital status: Single   Tobacco Use    Smoking status: Never    Smokeless tobacco: Never   Substance and Sexual Activity    Alcohol use: Yes     Comment: socially    Drug use: No     Social Drivers of Health     Financial Resource Strain: Low Risk  (10/1/2024)    Received from [facility]    Overall Financial Resource Strain (CARDIA)     Difficulty of Paying Living Expenses: Not hard at all   Food Insecurity: No Food Insecurity (10/1/2024)    Received from [facility]    Hunger Vital Sign     Worried About Running Out of Food in the Last Year: Never true     Ran Out of Food in the Last Year: Never true   Transportation Needs: No Transportation  Needs (10/1/2024)    Received from [facility]    PRAPARE - Transportation     Lack of Transportation (Medical): No     Lack of Transportation (Non-Medical): No   Physical Activity: Sufficiently Active (10/1/2024)    Received from [facility]    Exercise Vital Sign     Days of Exercise per Week: 7 days     Minutes of Exercise per Session: 40 min   Stress: No Stress Concern Present (10/1/2024)    Received from [facility]    [place] [place] [employer] - Occupational Stress Questionnaire     Feeling of Stress : Not at all       Chief Complaint:   Chief Complaint   Patient presents with    Left Knee - Pain       History of present illness:  59-year-old female seen for about 1 year of left knee pain.  There was no injury or trauma.  Patient has pain medially mostly at night.  Knee has not been improving with NSAIDs.  Knee is now starting to have mechanical symptoms with buckling and catching.  Patient has tried Aleve and Biofreeze.  Pain is a 3/10 during the day but it will wake her up at night in his up to an 8/10        Review of Systems:    Constitution: Negative for chills, fever, and sweats.  Negative for unexplained weight loss.    HENT:  Negative for headaches and blurry vision.    Cardiovascular:Negative for chest pain or irregular heart beat. Negative for hypertension.    Respiratory:  Negative for cough and shortness of breath.    Gastrointestinal: Negative for abdominal pain, heartburn, melena, nausea, and vomitting.    Genitourinary:  Negative bladder incontinence and dysuria.    Musculoskeletal:  See HPI    Neurological: Negative for numbness.    Psychiatric/Behavioral: Negative for depression.  The patient is not nervous/anxious.      Endocrine: Negative for polyuria    Hematologic/Lymphatic: Negative for bleeding problem.  Does not bruise/bleed easily.    Skin: Negative for poor would healing and rash      Physical Examination:    Vital Signs:    Vitals:    11/18/24 1518   Resp: 18        Body mass index is 31.29 kg/m².    This a well-developed, well nourished patient in no acute distress.  They are alert and oriented and cooperative to examination.  Pt. walks without an antalgic gait.      Examination of the left knee shows no rashes or erythema. There are no masses ecchymosis or effusion. Patient has full range of motion from 0-130°. Patient is nontender to palpation over lateral joint line and moderately tender to palpation over the medial joint line. Patient has a - Lachman exam, - anterior drawer exam, and - posterior drawer exam.  Positive medial Apley exam. Knee is stable to varus and valgus stress. 5 out of 5 motor strength. Palpable distal pulses. Intact light touch sensation. Negative Patellofemoral crepitus      X-rays:  X-rays left knee are ordered and reviewed which show mild medial narrowing bilaterally.         Assessment:  Left medial meniscal tear    Plan:  I reviewed the findings with her today.  I recommended an MRI to evaluate for left medial meniscus tear.  Patient has had symptoms now for over a year despite NSAID use during that time.  Follow up after the MRI is completed.            All previous pertinent notes including ER visits, physical therapy visits, other orthopedic visits as well as other care for the same musculoskeletal problem were reviewed.  All pertinent lab values and previous imaging was reviewed pertinent to the current visit.    This note was created using [employer] voice recognition software that occasionally misinterpreted phrases or words.    Consult note is delivered via Epic messaging service.

## 2024-11-27 ENCOUNTER — HOSPITAL ENCOUNTER (OUTPATIENT)
Dept: RADIOLOGY | Facility: HOSPITAL | Age: 59
Discharge: HOME | End: 2024-11-27
Attending: ORTHOPAEDIC SURGERY
Payer: COMMERCIAL

## 2024-11-27 DIAGNOSIS — S83.282A TEAR OF LATERAL MENISCUS OF LEFT KNEE, UNSPECIFIED TEAR TYPE, UNSPECIFIED WHETHER OLD OR CURRENT TEAR, INITIAL ENCOUNTER: ICD-10-CM

## 2024-11-27 PROCEDURE — 73721 MRI JNT OF LWR EXTRE W/O DYE: CPT | Mod: TC,PO,LT

## 2024-11-27 PROCEDURE — 73721 MRI JNT OF LWR EXTRE W/O DYE: CPT | Mod: 26,LT,, | Performed by: RADIOLOGY

## 2024-12-09 ENCOUNTER — OFFICE VISIT (OUTPATIENT)
Dept: ORTHOPEDICS | Facility: CLINIC | Age: 59
End: 2024-12-09
Payer: COMMERCIAL

## 2024-12-09 VITALS — WEIGHT: 188.06 LBS | HEIGHT: 65 IN | BODY MASS INDEX: 31.33 KG/M2

## 2024-12-09 DIAGNOSIS — S83.232D COMPLEX TEAR OF MEDIAL MENISCUS OF LEFT KNEE AS CURRENT INJURY, SUBSEQUENT ENCOUNTER: Primary | ICD-10-CM

## 2024-12-09 PROCEDURE — 1160F RVW MEDS BY RX/DR IN RCRD: CPT | Mod: CPTII,S$GLB,, | Performed by: ORTHOPAEDIC SURGERY

## 2024-12-09 PROCEDURE — 99999 PR PBB SHADOW E&M-EST. PATIENT-LVL III: CPT | Mod: PBBFAC,,, | Performed by: ORTHOPAEDIC SURGERY

## 2024-12-09 PROCEDURE — 1159F MED LIST DOCD IN RCRD: CPT | Mod: CPTII,S$GLB,, | Performed by: ORTHOPAEDIC SURGERY

## 2024-12-09 PROCEDURE — 99214 OFFICE O/P EST MOD 30 MIN: CPT | Mod: 25,S$GLB,, | Performed by: ORTHOPAEDIC SURGERY

## 2024-12-09 PROCEDURE — 3008F BODY MASS INDEX DOCD: CPT | Mod: CPTII,S$GLB,, | Performed by: ORTHOPAEDIC SURGERY

## 2024-12-09 PROCEDURE — 20610 DRAIN/INJ JOINT/BURSA W/O US: CPT | Mod: LT,S$GLB,, | Performed by: ORTHOPAEDIC SURGERY

## 2024-12-09 RX ORDER — TRIAMCINOLONE ACETONIDE 40 MG/ML
40 INJECTION, SUSPENSION INTRA-ARTICULAR; INTRAMUSCULAR
Status: DISCONTINUED | OUTPATIENT
Start: 2024-12-09 | End: 2024-12-09 | Stop reason: HOSPADM

## 2024-12-09 RX ADMIN — TRIAMCINOLONE ACETONIDE 40 MG: 40 INJECTION, SUSPENSION INTRA-ARTICULAR; INTRAMUSCULAR at 04:12

## 2024-12-09 NOTE — PROCEDURES
Large Joint Aspiration/Injection: L knee    Date/Time: 12/9/2024 4:00 PM    Performed by: Micha Couch MD  Authorized by: Micha Couch MD    Consent Done?:  Yes (Verbal)  Indications:  Pain  Site marked: the procedure site was marked    Timeout: prior to procedure the correct patient, procedure, and site was verified    Local anesthetic:  Lidocaine 1% without epinephrine and bupivacaine 0.25% without epinephrine  Anesthetic total (ml):  6      Details:  Needle Size:  20 G  Approach:  Anterolateral  Location:  Knee  Site:  L knee  Medications:  40 mg triamcinolone acetonide 40 mg/mL  Patient tolerance:  Patient tolerated the procedure well with no immediate complications

## 2024-12-09 NOTE — PROGRESS NOTES
Past Medical History:   Diagnosis Date    Arthritis        Past Surgical History:   Procedure Laterality Date    gastric sleeve         Current Outpatient Medications   Medication Sig    calcium carbonate (OS-DAMI) 500 mg calcium (1,250 mg) tablet Take 1 tablet by mouth once daily.    cyanocobalamin, vitamin B-12, 1,000 mcg/mL Kit Inject 1,000 mcg into the muscle every 30 days.    cyclobenzaprine (FLEXERIL) 10 MG tablet take 1 tablet by mouth NIGHTLY if needed for muscle spasm (WILL CAUSE DROWSINESS)    diclofenac sodium 1 % Gel     hyoscyamine (LEVSIN/SL) 0.125 mg Subl dissolve 1 tablet under the tongue four times a day if needed    multivitamin capsule Take 1 capsule by mouth once daily.    naproxen (NAPROSYN) 500 MG tablet Take 500 mg by mouth 2 (two) times daily with meals.    tramadol (ULTRAM) 50 mg tablet Take 50 mg by mouth every 6 (six) hours as needed.     No current facility-administered medications for this visit.       Review of patient's allergies indicates:   Allergen Reactions    Adhesive tape-silicones Itching    Adhesive Itching    Chlorhexidin-isopropyl alcohol Edema, Hives and Itching       Family History   Problem Relation Name Age of Onset    Hypertension Mother      Diabetes Mother      Cancer Father         Social History     Socioeconomic History    Marital status: Single   Tobacco Use    Smoking status: Never    Smokeless tobacco: Never   Substance and Sexual Activity    Alcohol use: Yes     Comment: socially    Drug use: No     Social Drivers of Health     Financial Resource Strain: Low Risk  (10/1/2024)    Received from [facility]    Overall Financial Resource Strain (CARDIA)     Difficulty of Paying Living Expenses: Not hard at all   Food Insecurity: No Food Insecurity (10/1/2024)    Received from [facility]    Hunger Vital Sign     Worried About Running Out of Food in the Last Year: Never true     Ran Out of Food in the Last Year: Never true   Transportation Needs: No Transportation  Needs (10/1/2024)    Received from [facility]    PRAPARE - Transportation     Lack of Transportation (Medical): No     Lack of Transportation (Non-Medical): No   Physical Activity: Sufficiently Active (10/1/2024)    Received from [facility]    Exercise Vital Sign     Days of Exercise per Week: 7 days     Minutes of Exercise per Session: 40 min   Stress: No Stress Concern Present (10/1/2024)    Received from [facility]    [place] of Occupational Health - Occupational Stress Questionnaire     Feeling of Stress : Not at all       Chief Complaint:   Chief Complaint   Patient presents with    Results     L knee MRI review       History of present illness:  59-year-old female seen for about 1 year of left knee pain.  There was no injury or trauma.  Patient has pain medially mostly at night.  Knee has not been improving with NSAIDs.  Knee is now starting to have mechanical symptoms with buckling and catching.  Patient has tried Aleve and Biofreeze.  Pain is a 3/10 during the day but it will wake her up at night in his up to an 8/10.  MRI confirmed a medial meniscal tear        Review of Systems:    Constitution: Negative for chills, fever, and sweats.  Negative for unexplained weight loss.    HENT:  Negative for headaches and blurry vision.    Cardiovascular:Negative for chest pain or irregular heart beat. Negative for hypertension.    Respiratory:  Negative for cough and shortness of breath.    Gastrointestinal: Negative for abdominal pain, heartburn, melena, nausea, and vomitting.    Genitourinary:  Negative bladder incontinence and dysuria.    Musculoskeletal:  See HPI    Neurological: Negative for numbness.    Psychiatric/Behavioral: Negative for depression.  The patient is not nervous/anxious.      Endocrine: Negative for polyuria    Hematologic/Lymphatic: Negative for bleeding problem.  Does not bruise/bleed easily.    Skin: Negative for poor would healing and rash      Physical Examination:    Vital  Signs:    There were no vitals filed for this visit.      Body mass index is 31.29 kg/m².    This a well-developed, well nourished patient in no acute distress.  They are alert and oriented and cooperative to examination.  Pt. walks without an antalgic gait.      Examination of the left knee shows no rashes or erythema. There are no masses ecchymosis or effusion. Patient has full range of motion from 0-130°. Patient is nontender to palpation over lateral joint line and moderately tender to palpation over the medial joint line. Patient has a - Lachman exam, - anterior drawer exam, and - posterior drawer exam.  Positive medial Apley exam. Knee is stable to varus and valgus stress. 5 out of 5 motor strength. Palpable distal pulses. Intact light touch sensation. Negative Patellofemoral crepitus      X-rays:  X-rays left knee are  reviewed which show mild medial narrowing bilaterally.    MRI of the left knee is reviewed and interpreted:1.  No acute fracture dislocation or knee joint effusion.  2.  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  3.  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         Assessment:  Left medial meniscal tear    Plan:  I reviewed the findings with her today.  We discussed meniscal tears.  Patient would like to avoid surgery.  Plan he is a cortisone injection today as well as formal physical therapy for a medial meniscal tear.  I will see her back in 2 months.            All previous pertinent notes including ER visits, physical therapy visits, other orthopedic visits as well as other care for the same musculoskeletal problem were reviewed.  All pertinent lab values and previous imaging was reviewed pertinent to the current visit.    This note was created using [employer] voice recognition software that occasionally  misinterpreted phrases or words.    Consult note is delivered via Epic messaging service.

## 2025-05-19 ENCOUNTER — OFFICE VISIT (OUTPATIENT)
Dept: ORTHOPEDICS | Facility: CLINIC | Age: 60
End: 2025-05-19
Payer: COMMERCIAL

## 2025-05-19 VITALS — BODY MASS INDEX: 31.33 KG/M2 | RESPIRATION RATE: 16 BRPM | WEIGHT: 188.06 LBS | HEIGHT: 65 IN

## 2025-05-19 DIAGNOSIS — M17.10 ARTHRITIS OF KNEE: Primary | ICD-10-CM

## 2025-05-19 DIAGNOSIS — M17.12 PRIMARY OSTEOARTHRITIS OF LEFT KNEE: Primary | ICD-10-CM

## 2025-05-19 PROCEDURE — 3008F BODY MASS INDEX DOCD: CPT | Mod: CPTII,S$GLB,, | Performed by: ORTHOPAEDIC SURGERY

## 2025-05-19 PROCEDURE — 1159F MED LIST DOCD IN RCRD: CPT | Mod: CPTII,S$GLB,, | Performed by: ORTHOPAEDIC SURGERY

## 2025-05-19 PROCEDURE — 1160F RVW MEDS BY RX/DR IN RCRD: CPT | Mod: CPTII,S$GLB,, | Performed by: ORTHOPAEDIC SURGERY

## 2025-05-19 PROCEDURE — 99999 PR PBB SHADOW E&M-EST. PATIENT-LVL III: CPT | Mod: PBBFAC,,, | Performed by: ORTHOPAEDIC SURGERY

## 2025-05-19 PROCEDURE — 99214 OFFICE O/P EST MOD 30 MIN: CPT | Mod: S$GLB,,, | Performed by: ORTHOPAEDIC SURGERY

## 2025-05-19 NOTE — PROGRESS NOTES
Past Medical History:   Diagnosis Date    Arthritis        Past Surgical History:   Procedure Laterality Date    gastric sleeve         Current Outpatient Medications   Medication Sig    calcium carbonate (OS-DAMI) 500 mg calcium (1,250 mg) tablet Take 1 tablet by mouth once daily.    cyanocobalamin, vitamin B-12, 1,000 mcg/mL Kit Inject 1,000 mcg into the muscle every 30 days.    cyclobenzaprine (FLEXERIL) 10 MG tablet take 1 tablet by mouth NIGHTLY if needed for muscle spasm (WILL CAUSE DROWSINESS)    diclofenac sodium 1 % Gel     hyoscyamine (LEVSIN/SL) 0.125 mg Subl dissolve 1 tablet under the tongue four times a day if needed    multivitamin capsule Take 1 capsule by mouth once daily.     No current facility-administered medications for this visit.       Review of patient's allergies indicates:   Allergen Reactions    Adhesive tape-silicones Itching    Adhesive Itching    Chlorhexidin-isopropyl alcohol Edema, Hives and Itching       Family History   Problem Relation Name Age of Onset    Hypertension Mother      Diabetes Mother      Cancer Father         Social History     Socioeconomic History    Marital status: Single   Tobacco Use    Smoking status: Never    Smokeless tobacco: Never   Substance and Sexual Activity    Alcohol use: Yes     Comment: socially    Drug use: No     Social Drivers of Health     Financial Resource Strain: Low Risk  (10/1/2024)    Received from [facility]    Overall Financial Resource Strain (CARDIA)     Difficulty of Paying Living Expenses: Not hard at all   Food Insecurity: No Food Insecurity (10/1/2024)    Received from [facility]    Hunger Vital Sign     Worried About Running Out of Food in the Last Year: Never true     Ran Out of Food in the Last Year: Never true   Transportation Needs: No Transportation Needs (10/1/2024)    Received from [facility]    PRAPARE - Transportation     Lack of Transportation (Medical): No     Lack of Transportation (Non-Medical): No   Physical  Activity: Sufficiently Active (10/1/2024)    Received from [facility]    Exercise Vital Sign     Days of Exercise per Week: 7 days     Minutes of Exercise per Session: 40 min   Stress: No Stress Concern Present (10/1/2024)    Received from [facility]    [place] of Occupational Health - Occupational Stress Questionnaire     Feeling of Stress : Not at all       Chief Complaint:   Chief Complaint   Patient presents with    Left Knee - Pain       History of present illness:  59-year-old female seen for about 1 year of left knee pain.  There was no injury or trauma.  Patient has pain medially mostly at night.  Knee has not been improving with NSAIDs.  Knee starting to have mechanical symptoms with buckling and catching.  Patient has tried Aleve and Biofreeze.   MRI confirmed a medial meniscal tear.  She wanted to avoid surgery so did some physical therapy and tried a cortisone injection        Review of Systems:  Musculoskeletal:  See HPI        Physical Examination:    Vital Signs:    Vitals:    05/19/25 1554   Resp: 16         Body mass index is 31.29 kg/m².    This a well-developed, well nourished patient in no acute distress.  They are alert and oriented and cooperative to examination.  Pt. walks without an antalgic gait.      Examination of the left knee shows no rashes or erythema. There are no masses ecchymosis or effusion. Patient has full range of motion from 0-130°. Patient is nontender to palpation over lateral joint line and moderately tender to palpation over the medial joint line. Patient has a - Lachman exam, - anterior drawer exam, and - posterior drawer exam.  Positive medial Apley exam. Knee is stable to varus and valgus stress. 5 out of 5 motor strength. Palpable distal pulses. Intact light touch sensation. Negative Patellofemoral crepitus      X-rays:  X-rays left knee are  reviewed which show mild to moderate medial narrowing bilaterally.  Kellgren Tai grade of 2    MRI of the left  knee is reviewed and interpreted:1.  No acute fracture dislocation or knee joint effusion.  2.  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  3.  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         Assessment:  Left knee arthritis    Plan:  I reviewed the findings with her today.  We discussed meniscal tears as well as her arthritis.  She is trying to avoid surgery.  We will do a viscosupplementation series.      The patient has tried a self guided exercise program that has included walking without significant improvement. Minimal relief with tylenol or OTC Nsaids. Reports less than 8 weeks relief with IA steroid injection. Kellgren Tai scale of 2. They are not receiving another HA injectable at this time. I will precert for gel injection.             All previous pertinent notes including ER visits, physical therapy visits, other orthopedic visits as well as other care for the same musculoskeletal problem were reviewed.  All pertinent lab values and previous imaging was reviewed pertinent to the current visit.    This note was created using [employer] voice recognition software that occasionally misinterpreted phrases or words.    Consult note is delivered via Epic messaging service.

## 2025-06-09 ENCOUNTER — OFFICE VISIT (OUTPATIENT)
Dept: ORTHOPEDICS | Facility: CLINIC | Age: 60
End: 2025-06-09
Payer: COMMERCIAL

## 2025-06-09 VITALS — HEIGHT: 65 IN | WEIGHT: 188.06 LBS | BODY MASS INDEX: 31.33 KG/M2 | RESPIRATION RATE: 18 BRPM

## 2025-06-09 DIAGNOSIS — M17.12 PRIMARY OSTEOARTHRITIS OF LEFT KNEE: Primary | ICD-10-CM

## 2025-06-09 PROCEDURE — 99999 PR PBB SHADOW E&M-EST. PATIENT-LVL III: CPT | Mod: PBBFAC,,, | Performed by: ORTHOPAEDIC SURGERY

## 2025-06-09 PROCEDURE — 99499 UNLISTED E&M SERVICE: CPT | Mod: S$GLB,,, | Performed by: ORTHOPAEDIC SURGERY

## 2025-06-09 PROCEDURE — 20610 DRAIN/INJ JOINT/BURSA W/O US: CPT | Mod: LT,,, | Performed by: ORTHOPAEDIC SURGERY

## 2025-06-09 NOTE — PROGRESS NOTES
Past Medical History:   Diagnosis Date    Arthritis        Past Surgical History:   Procedure Laterality Date    gastric sleeve         Current Outpatient Medications   Medication Sig    calcium carbonate (OS-DAMI) 500 mg calcium (1,250 mg) tablet Take 1 tablet by mouth once daily.    cyanocobalamin, vitamin B-12, 1,000 mcg/mL Kit Inject 1,000 mcg into the muscle every 30 days.    cyclobenzaprine (FLEXERIL) 10 MG tablet take 1 tablet by mouth NIGHTLY if needed for muscle spasm (WILL CAUSE DROWSINESS)    diclofenac sodium 1 % Gel     hyoscyamine (LEVSIN/SL) 0.125 mg Subl dissolve 1 tablet under the tongue four times a day if needed    multivitamin capsule Take 1 capsule by mouth once daily.     No current facility-administered medications for this visit.       Review of patient's allergies indicates:   Allergen Reactions    Adhesive tape-silicones Itching    Adhesive Itching    Chlorhexidin-isopropyl alcohol Edema, Hives and Itching       Family History   Problem Relation Name Age of Onset    Hypertension Mother      Diabetes Mother      Cancer Father         Social History     Socioeconomic History    Marital status: Single   Tobacco Use    Smoking status: Never    Smokeless tobacco: Never   Substance and Sexual Activity    Alcohol use: Yes     Comment: socially    Drug use: No     Social Drivers of Health     Financial Resource Strain: Low Risk  (10/1/2024)    Received from [facility]    Overall Financial Resource Strain (CARDIA)     Difficulty of Paying Living Expenses: Not hard at all   Food Insecurity: No Food Insecurity (10/1/2024)    Received from [facility]    Hunger Vital Sign     Worried About Running Out of Food in the Last Year: Never true     Ran Out of Food in the Last Year: Never true   Transportation Needs: No Transportation Needs (10/1/2024)    Received from [facility]    PRAPARE - Transportation     Lack of Transportation (Medical): No     Lack of Transportation (Non-Medical): No   Physical  Activity: Sufficiently Active (10/1/2024)    Received from [facility]    Exercise Vital Sign     Days of Exercise per Week: 7 days     Minutes of Exercise per Session: 40 min   Stress: No Stress Concern Present (10/1/2024)    Received from [facility] [employer]    [place] of Occupational Health - Occupational Stress Questionnaire     Feeling of Stress : Not at all       Chief Complaint:   No chief complaint on file.      History of present illness:  59-year-old female seen for about 1 year of left knee pain.  There was no injury or trauma.  Patient has pain medially mostly at night.  Knee has not been improving with NSAIDs.  Knee starting to have mechanical symptoms with buckling and catching.  Patient has tried Aleve and Biofreeze.   MRI confirmed a medial meniscal tear.  She wanted to avoid surgery so did some physical therapy and tried a cortisone injection        Review of Systems:  Musculoskeletal:  See HPI        Physical Examination:    Vital Signs:    There were no vitals filed for this visit.        There is no height or weight on file to calculate BMI.    This a well-developed, well nourished patient in no acute distress.  They are alert and oriented and cooperative to examination.  Pt. walks without an antalgic gait.      Examination of the left knee shows no rashes or erythema. There are no masses ecchymosis or effusion. Patient has full range of motion from 0-130°. Patient is nontender to palpation over lateral joint line and moderately tender to palpation over the medial joint line. Patient has a - Lachman exam, - anterior drawer exam, and - posterior drawer exam.  Positive medial Apley exam. Knee is stable to varus and valgus stress. 5 out of 5 motor strength. Palpable distal pulses. Intact light touch sensation. Negative Patellofemoral crepitus      X-rays:  X-rays left knee are  reviewed which show mild to moderate medial narrowing bilaterally.  Kellgren Tai grade of 2    MRI of the left knee  is reviewed and interpreted:1.  No acute fracture dislocation or knee joint effusion.  2.  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  3.  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         Assessment:  Left knee arthritis    Plan:  I reviewed the findings with her today.  We discussed meniscal tears as well as her arthritis.  I injected her left knee with Synvisc 1 of 3.  Follow up next week.    The patient has tried a self guided exercise program that has included walking without significant improvement. Minimal relief with tylenol or OTC Nsaids. Reports less than 8 weeks relief with IA steroid injection. Kellgren Tai scale of 2. They are not receiving another HA injectable at this time. I will precert for gel injection.             All previous pertinent notes including ER visits, physical therapy visits, other orthopedic visits as well as other care for the same musculoskeletal problem were reviewed.  All pertinent lab values and previous imaging was reviewed pertinent to the current visit.    This note was created using [employer] voice recognition software that occasionally misinterpreted phrases or words.    Consult note is delivered via Epic messaging service.

## 2025-06-09 NOTE — PROCEDURES
Large Joint Aspiration/Injection: L knee joint    Date/Time: 6/9/2025 3:30 PM    Performed by: Micha Couch MD  Authorized by: Micha Couch MD    Consent Done?:  Yes (Verbal)  Indications:  Pain  Site marked: the procedure site was marked    Timeout: prior to procedure the correct patient, procedure, and site was verified      Details:  Needle Size:  20 G  Approach:  Anterolateral  Location:  Knee  Site:  L knee joint  Medications:  16 mg hyaluronate 16 mg/2 mL  Patient tolerance:  Patient tolerated the procedure well with no immediate complications

## 2025-06-16 ENCOUNTER — OFFICE VISIT (OUTPATIENT)
Dept: ORTHOPEDICS | Facility: CLINIC | Age: 60
End: 2025-06-16
Payer: COMMERCIAL

## 2025-06-16 VITALS — WEIGHT: 188.06 LBS | BODY MASS INDEX: 31.33 KG/M2 | HEIGHT: 65 IN

## 2025-06-16 DIAGNOSIS — M17.12 PRIMARY OSTEOARTHRITIS OF LEFT KNEE: Primary | ICD-10-CM

## 2025-06-16 PROCEDURE — 20610 DRAIN/INJ JOINT/BURSA W/O US: CPT | Mod: LT,S$GLB,, | Performed by: ORTHOPAEDIC SURGERY

## 2025-06-16 PROCEDURE — 99499 UNLISTED E&M SERVICE: CPT | Mod: 25,S$GLB,, | Performed by: ORTHOPAEDIC SURGERY

## 2025-06-16 PROCEDURE — 99999 PR PBB SHADOW E&M-EST. PATIENT-LVL III: CPT | Mod: PBBFAC,,, | Performed by: ORTHOPAEDIC SURGERY

## 2025-06-16 NOTE — PROGRESS NOTES
Past Medical History:   Diagnosis Date    Arthritis        Past Surgical History:   Procedure Laterality Date    gastric sleeve         Current Outpatient Medications   Medication Sig    calcium carbonate (OS-DAMI) 500 mg calcium (1,250 mg) tablet Take 1 tablet by mouth once daily.    cyanocobalamin, vitamin B-12, 1,000 mcg/mL Kit Inject 1,000 mcg into the muscle every 30 days.    diclofenac sodium 1 % Gel     hyoscyamine (LEVSIN/SL) 0.125 mg Subl dissolve 1 tablet under the tongue four times a day if needed    multivitamin capsule Take 1 capsule by mouth once daily.     No current facility-administered medications for this visit.       Review of patient's allergies indicates:   Allergen Reactions    Adhesive tape-silicones Itching    Adhesive Itching    Chlorhexidin-isopropyl alcohol Edema, Hives and Itching       Family History   Problem Relation Name Age of Onset    Hypertension Mother      Diabetes Mother      Cancer Father         Social History     Socioeconomic History    Marital status: Single   Tobacco Use    Smoking status: Never    Smokeless tobacco: Never   Substance and Sexual Activity    Alcohol use: Yes     Comment: socially    Drug use: No     Social Drivers of Health     Financial Resource Strain: Low Risk  (10/1/2024)    Received from [facility]    Overall Financial Resource Strain (CARDIA)     Difficulty of Paying Living Expenses: Not hard at all   Food Insecurity: No Food Insecurity (10/1/2024)    Received from [facility]    Hunger Vital Sign     Worried About Running Out of Food in the Last Year: Never true     Ran Out of Food in the Last Year: Never true   Transportation Needs: No Transportation Needs (10/1/2024)    Received from [facility]    PRAPARE - Transportation     Lack of Transportation (Medical): No     Lack of Transportation (Non-Medical): No   Physical Activity: Sufficiently Active (10/1/2024)    Received from [facility]    Exercise Vital Sign     Days of Exercise per Week: 7 days      Minutes of Exercise per Session: 40 min   Stress: No Stress Concern Present (10/1/2024)    [facility] - Occupational Stress Questionnaire     Feeling of Stress : Not at all       Chief Complaint:   Chief Complaint   Patient presents with    Injections     L synvisc 2/3       History of present illness:  59-year-old female seen for about 1 year of left knee pain.  There was no injury or trauma.  Patient has pain medially mostly at night.  Knee has not been improving with NSAIDs.  Knee starting to have mechanical symptoms with buckling and catching.  Patient has tried Aleve and Biofreeze.   MRI confirmed a medial meniscal tear.  She wanted to avoid surgery so did some physical therapy and tried a cortisone injection        Review of Systems:  Musculoskeletal:  See HPI        Physical Examination:    Vital Signs:    There were no vitals filed for this visit.        Body mass index is 31.29 kg/m².    This a well-developed, well nourished patient in no acute distress.  They are alert and oriented and cooperative to examination.  Pt. walks without an antalgic gait.      Examination of the left knee shows no rashes or erythema. There are no masses ecchymosis or effusion. Patient has full range of motion from 0-130°. Patient is nontender to palpation over lateral joint line and moderately tender to palpation over the medial joint line. Patient has a - Lachman exam, - anterior drawer exam, and - posterior drawer exam.  Positive medial Apley exam. Knee is stable to varus and valgus stress. 5 out of 5 motor strength. Palpable distal pulses. Intact light touch sensation. Negative Patellofemoral crepitus      X-rays:  X-rays left knee are  reviewed which show mild to moderate medial narrowing bilaterally.  Kellgren Tai grade of 2    MRI of the left knee is reviewed and interpreted:1.  No acute fracture dislocation or knee joint effusion.  2.  Tricompartment  chondromalacia/cartilage loss in osteoarthrosis most pronounced in the patellofemoral and medial compartment.  3.  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         Assessment:  Left knee arthritis    Plan:  I reviewed the findings with her today.  We discussed meniscal tears as well as her arthritis.  I injected her left knee with Synvisc 2 of 3.  Follow up next week.    The patient has tried a self guided exercise program that has included walking without significant improvement. Minimal relief with tylenol or OTC Nsaids. Reports less than 8 weeks relief with IA steroid injection. Kellgren Tai scale of 2. They are not receiving another HA injectable at this time. I will precert for gel injection.             All previous pertinent notes including ER visits, physical therapy visits, other orthopedic visits as well as other care for the same musculoskeletal problem were reviewed.  All pertinent lab values and previous imaging was reviewed pertinent to the current visit.    This note was created using [employer] voice recognition software that occasionally misinterpreted phrases or words.    Consult note is delivered via Epic messaging service.

## 2025-06-16 NOTE — PROCEDURES
Large Joint Aspiration/Injection: L knee joint    Date/Time: 6/16/2025 3:30 PM    Performed by: Micha Couch MD  Authorized by: Micha Couch MD    Consent Done?:  Yes (Verbal)  Indications:  Pain  Site marked: the procedure site was marked    Timeout: prior to procedure the correct patient, procedure, and site was verified      Details:  Needle Size:  20 G  Approach:  Anterolateral  Location:  Knee  Site:  L knee joint  Medications:  16 mg hyaluronate 16 mg/2 mL  Patient tolerance:  Patient tolerated the procedure well with no immediate complications

## 2025-06-23 ENCOUNTER — OFFICE VISIT (OUTPATIENT)
Dept: ORTHOPEDICS | Facility: CLINIC | Age: 60
End: 2025-06-23
Payer: COMMERCIAL

## 2025-06-23 VITALS — RESPIRATION RATE: 18 BRPM

## 2025-06-23 DIAGNOSIS — M17.12 PRIMARY OSTEOARTHRITIS OF LEFT KNEE: Primary | ICD-10-CM

## 2025-06-23 PROCEDURE — 99999 PR PBB SHADOW E&M-EST. PATIENT-LVL II: CPT | Mod: PBBFAC,,, | Performed by: ORTHOPAEDIC SURGERY

## 2025-06-23 PROCEDURE — 20610 DRAIN/INJ JOINT/BURSA W/O US: CPT | Mod: LT,S$GLB,, | Performed by: ORTHOPAEDIC SURGERY

## 2025-06-23 PROCEDURE — 99499 UNLISTED E&M SERVICE: CPT | Mod: 25,S$GLB,, | Performed by: ORTHOPAEDIC SURGERY

## 2025-06-23 NOTE — PROCEDURES
Large Joint Aspiration/Injection: L knee joint    Date/Time: 6/23/2025 3:15 PM    Performed by: Micha Couch MD  Authorized by: Micha Couch MD    Consent Done?:  Yes (Verbal)  Indications:  Pain  Site marked: the procedure site was marked    Timeout: prior to procedure the correct patient, procedure, and site was verified      Details:  Needle Size:  20 G  Approach:  Anterolateral  Location:  Knee  Site:  L knee joint  Medications:  16 mg hyaluronate 16 mg/2 mL  Patient tolerance:  Patient tolerated the procedure well with no immediate complications

## 2025-06-23 NOTE — PROGRESS NOTES
Past Medical History:   Diagnosis Date    Arthritis        Past Surgical History:   Procedure Laterality Date    gastric sleeve         Current Outpatient Medications   Medication Sig    calcium carbonate (OS-DAMI) 500 mg calcium (1,250 mg) tablet Take 1 tablet by mouth once daily.    cyanocobalamin, vitamin B-12, 1,000 mcg/mL Kit Inject 1,000 mcg into the muscle every 30 days.    diclofenac sodium 1 % Gel     hyoscyamine (LEVSIN/SL) 0.125 mg Subl dissolve 1 tablet under the tongue four times a day if needed    multivitamin capsule Take 1 capsule by mouth once daily.     No current facility-administered medications for this visit.       Review of patient's allergies indicates:   Allergen Reactions    Adhesive tape-silicones Itching    Adhesive Itching    Chlorhexidin-isopropyl alcohol Edema, Hives and Itching       Family History   Problem Relation Name Age of Onset    Hypertension Mother      Diabetes Mother      Cancer Father         Social History     Socioeconomic History    Marital status: Single   Tobacco Use    Smoking status: Never    Smokeless tobacco: Never   Substance and Sexual Activity    Alcohol use: Yes     Comment: socially    Drug use: No     Social Drivers of Health     Financial Resource Strain: Low Risk  (10/1/2024)    Received from [facility]    Overall Financial Resource Strain (CARDIA)     Difficulty of Paying Living Expenses: Not hard at all   Food Insecurity: No Food Insecurity (10/1/2024)    Received from [facility]    Hunger Vital Sign     Worried About Running Out of Food in the Last Year: Never true     Ran Out of Food in the Last Year: Never true   Transportation Needs: No Transportation Needs (10/1/2024)    Received from [facility]    PRAPARE - Transportation     Lack of Transportation (Medical): No     Lack of Transportation (Non-Medical): No   Physical Activity: Sufficiently Active (10/1/2024)    Received from [facility]    Exercise Vital Sign     Days of Exercise per Week: 7 days      Minutes of Exercise per Session: 40 min   Stress: No Stress Concern Present (10/1/2024)    Received from [facility]ational [facility] - Occupational Stress Questionnaire     Feeling of Stress : Not at all       Chief Complaint:   No chief complaint on file.      History of present illness:  59-year-old female seen for about 1 year of left knee pain.  There was no injury or trauma.  Patient has pain medially mostly at night.  Knee has not been improving with NSAIDs.  Knee starting to have mechanical symptoms with buckling and catching.  Patient has tried Aleve and Biofreeze.   MRI confirmed a medial meniscal tear.  She wanted to avoid surgery so did some physical therapy and tried a cortisone injection        Review of Systems:  Musculoskeletal:  See HPI        Physical Examination:    Vital Signs:    There were no vitals filed for this visit.        There is no height or weight on file to calculate BMI.    This a well-developed, well nourished patient in no acute distress.  They are alert and oriented and cooperative to examination.  Pt. walks without an antalgic gait.      Examination of the left knee shows no rashes or erythema. There are no masses ecchymosis or effusion. Patient has full range of motion from 0-130°. Patient is nontender to palpation over lateral joint line and moderately tender to palpation over the medial joint line. Patient has a - Lachman exam, - anterior drawer exam, and - posterior drawer exam.  Positive medial Apley exam. Knee is stable to varus and valgus stress. 5 out of 5 motor strength. Palpable distal pulses. Intact light touch sensation. Negative Patellofemoral crepitus      X-rays:  X-rays left knee are  reviewed which show mild to moderate medial narrowing bilaterally.  Kellgren Tai grade of 2    MRI of the left knee is reviewed and interpreted:1.  No acute fracture dislocation or knee joint effusion.  2.  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  3.  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         Assessment:  Left knee arthritis    Plan:  I reviewed the findings with her today.  We discussed meniscal tears as well as her arthritis.  I injected her left knee with Synvisc 3 of 3.  Follow up in 4 weeks or longer    The patient has tried a self guided exercise program that has included walking without significant improvement. Minimal relief with tylenol or OTC Nsaids. Reports less than 8 weeks relief with IA steroid injection. Kellgren Tai scale of 2. They are not receiving another HA injectable at this time. I will precert for gel injection.             All previous pertinent notes including ER visits, physical therapy visits, other orthopedic visits as well as other care for the same musculoskeletal problem were reviewed.  All pertinent lab values and previous imaging was reviewed pertinent to the current visit.    This note was created using [employer] voice recognition software that occasionally misinterpreted phrases or words.    Consult note is delivered via Epic messaging service.

## 2025-07-28 ENCOUNTER — OFFICE VISIT (OUTPATIENT)
Dept: ORTHOPEDICS | Facility: CLINIC | Age: 60
End: 2025-07-28
Payer: COMMERCIAL

## 2025-07-28 DIAGNOSIS — M17.12 PRIMARY OSTEOARTHRITIS OF LEFT KNEE: Primary | ICD-10-CM

## 2025-07-28 PROCEDURE — 99999 PR PBB SHADOW E&M-EST. PATIENT-LVL II: CPT | Mod: PBBFAC,,, | Performed by: ORTHOPAEDIC SURGERY

## 2025-07-28 PROCEDURE — 1159F MED LIST DOCD IN RCRD: CPT | Mod: CPTII,S$GLB,, | Performed by: ORTHOPAEDIC SURGERY

## 2025-07-28 PROCEDURE — 99213 OFFICE O/P EST LOW 20 MIN: CPT | Mod: S$GLB,,, | Performed by: ORTHOPAEDIC SURGERY

## 2025-07-28 PROCEDURE — 1160F RVW MEDS BY RX/DR IN RCRD: CPT | Mod: CPTII,S$GLB,, | Performed by: ORTHOPAEDIC SURGERY

## 2025-07-28 NOTE — PROGRESS NOTES
Past Medical History:   Diagnosis Date    Arthritis        Past Surgical History:   Procedure Laterality Date    gastric sleeve         Current Outpatient Medications   Medication Sig    calcium carbonate (OS-DAMI) 500 mg calcium (1,250 mg) tablet Take 1 tablet by mouth once daily.    cyanocobalamin, vitamin B-12, 1,000 mcg/mL Kit Inject 1,000 mcg into the muscle every 30 days.    diclofenac sodium 1 % Gel     hyoscyamine (LEVSIN/SL) 0.125 mg Subl dissolve 1 tablet under the tongue four times a day if needed    multivitamin capsule Take 1 capsule by mouth once daily.     No current facility-administered medications for this visit.       Review of patient's allergies indicates:   Allergen Reactions    Adhesive tape-silicones Itching    Adhesive Itching    Chlorhexidin-isopropyl alcohol Edema, Hives and Itching       Family History   Problem Relation Name Age of Onset    Hypertension Mother      Diabetes Mother      Cancer Father         Social History     Socioeconomic History    Marital status: Single   Tobacco Use    Smoking status: Never    Smokeless tobacco: Never   Substance and Sexual Activity    Alcohol use: Yes     Comment: socially    Drug use: No     Social Drivers of Health     Financial Resource Strain: Low Risk  (10/1/2024)    Received from [facility]    Overall Financial Resource Strain (CARDIA)     Difficulty of Paying Living Expenses: Not hard at all   Food Insecurity: No Food Insecurity (10/1/2024)    Received from [facility]    Hunger Vital Sign     Worried About Running Out of Food in the Last Year: Never true     Ran Out of Food in the Last Year: Never true   Transportation Needs: No Transportation Needs (10/1/2024)    Received from [facility]    PRAPARE - Transportation     Lack of Transportation (Medical): No     Lack of Transportation (Non-Medical): No   Physical Activity: Sufficiently Active (10/1/2024)    Received from [facility]    Exercise Vital Sign     Days of Exercise per Week: 7 days      Minutes of Exercise per Session: 40 min   Stress: No Stress Concern Present (10/1/2024)    Received from [facility] of Occupational Health - Occupational Stress Questionnaire     Feeling of Stress : Not at all       Chief Complaint:   No chief complaint on file.      History of present illness:  59-year-old female seen for about 1 year of left knee pain.  There was no injury or trauma.  Patient has pain medially mostly at night.  Knee has not been improving with NSAIDs.  Knee starting to have mechanical symptoms with buckling and catching.  Patient has tried Aleve and Biofreeze.   MRI confirmed a medial meniscal tear.  She wanted to avoid surgery so did some physical therapy and tried a cortisone injection.  We tried Synvisc as well.  It worked great.  Pain is down to a 1/10        Review of Systems:  Musculoskeletal:  See HPI        Physical Examination:    Vital Signs:    There were no vitals filed for this visit.        There is no height or weight on file to calculate BMI.    This a well-developed, well nourished patient in no acute distress.  They are alert and oriented and cooperative to examination.  Pt. walks without an antalgic gait.      Examination of the left knee shows no rashes or erythema. There are no masses ecchymosis or effusion. Patient has full range of motion from 0-130°. Patient is nontender to palpation over lateral joint line and moderately tender to palpation over the medial joint line.  Positive medial Apley exam. Knee is stable to varus and valgus stress. 5 out of 5 motor strength. Palpable distal pulses. Intact light touch sensation. Negative Patellofemoral crepitus      X-rays:  X-rays left knee are  reviewed which show mild to moderate medial narrowing bilaterally.  Kellgren Tai grade of 2    MRI of the left knee is reviewed and interpreted:1.  No acute fracture dislocation or knee joint effusion.  2.  Tricompartment chondromalacia/cartilage loss in  osteoarthrosis most pronounced in the patellofemoral and medial compartment.  3.  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         Assessment:  Left knee arthritis    Plan:  I reviewed the findings with her today.  We discussed meniscal tears as well as her arthritis.  I am glad the Visco helped her a lot.  We talked about repeating this as needed.    The patient has tried a self guided exercise program that has included walking without significant improvement. Minimal relief with tylenol or OTC Nsaids. Reports less than 8 weeks relief with IA steroid injection. Kellgren Tai scale of 2. They are not receiving another HA injectable at this time. I will precert for gel injection.             All previous pertinent notes including ER visits, physical therapy visits, other orthopedic visits as well as other care for the same musculoskeletal problem were reviewed.  All pertinent lab values and previous imaging was reviewed pertinent to the current visit.    This note was created using [employer] voice recognition software that occasionally misinterpreted phrases or words.    Consult note is delivered via Epic messaging service.